# Patient Record
Sex: FEMALE | Race: ASIAN | URBAN - METROPOLITAN AREA
[De-identification: names, ages, dates, MRNs, and addresses within clinical notes are randomized per-mention and may not be internally consistent; named-entity substitution may affect disease eponyms.]

---

## 2017-12-06 ENCOUNTER — HISTORICAL (OUTPATIENT)
Dept: ADMINISTRATIVE | Facility: HOSPITAL | Age: 32
End: 2017-12-06

## 2017-12-06 LAB
ALBUMIN SERPL-MCNC: 2.7 GM/DL (ref 3.4–5)
ALBUMIN/GLOB SERPL: 1 RATIO (ref 1–2)
ALP SERPL-CCNC: 68 UNIT/L (ref 45–117)
ALT SERPL-CCNC: 16 UNIT/L (ref 12–78)
AST SERPL-CCNC: 12 UNIT/L (ref 15–37)
BILIRUB SERPL-MCNC: 0.3 MG/DL (ref 0.2–1)
BILIRUB SERPL-MCNC: NEGATIVE MG/DL
BILIRUBIN DIRECT+TOT PNL SERPL-MCNC: 0.1 MG/DL
BILIRUBIN DIRECT+TOT PNL SERPL-MCNC: 0.2 MG/DL
BLOOD URINE, POC: NEGATIVE
BUN SERPL-MCNC: 12 MG/DL (ref 7–18)
CALCIUM SERPL-MCNC: 8.7 MG/DL (ref 8.5–10.1)
CHLORIDE SERPL-SCNC: 108 MMOL/L (ref 98–107)
CLARITY, POC UA: CLEAR
CO2 SERPL-SCNC: 22 MMOL/L (ref 21–32)
COLOR, POC UA: YELLOW
CREAT SERPL-MCNC: 0.5 MG/DL (ref 0.6–1.3)
GLOBULIN SER-MCNC: 3.6 GM/ML (ref 2.3–3.5)
GLUCOSE SERPL-MCNC: 73 MG/DL (ref 74–106)
GLUCOSE UR QL STRIP: NEGATIVE
HBV CORE AB SERPL QL IA: REACTIVE
HBV SURFACE AG SERPL QL IA: POSITIVE
KETONES UR QL STRIP: NEGATIVE
LEUKOCYTE EST, POC UA: NORMAL
NITRITE, POC UA: NEGATIVE
PH, POC UA: 6
POTASSIUM SERPL-SCNC: 4.4 MMOL/L (ref 3.5–5.1)
PROT SERPL-MCNC: 6.3 GM/DL (ref 6.4–8.2)
PROTEIN, POC: NORMAL
SODIUM SERPL-SCNC: 141 MMOL/L (ref 136–145)
SPECIFIC GRAVITY, POC UA: 1.02
UROBILINOGEN, POC UA: NORMAL

## 2017-12-11 LAB
BILIRUB SERPL-MCNC: NEGATIVE MG/DL
BLOOD URINE, POC: NEGATIVE
CLARITY, POC UA: CLEAR
COLOR, POC UA: YELLOW
GLUCOSE UR QL STRIP: NEGATIVE
KETONES UR QL STRIP: NEGATIVE
LEUKOCYTE EST, POC UA: NEGATIVE
NITRITE, POC UA: NEGATIVE
PH, POC UA: 5
PROTEIN, POC: NORMAL
SPECIFIC GRAVITY, POC UA: 1.02
UROBILINOGEN, POC UA: NORMAL

## 2017-12-12 ENCOUNTER — HISTORICAL (OUTPATIENT)
Dept: ADMINISTRATIVE | Facility: HOSPITAL | Age: 32
End: 2017-12-12

## 2017-12-12 LAB
ABS NEUT (OLG): 4.76 X10(3)/MCL (ref 2.1–9.2)
BASOPHILS # BLD AUTO: 0.03 X10(3)/MCL
BASOPHILS NFR BLD AUTO: 0 % (ref 0–1)
EOSINOPHIL # BLD AUTO: 0.07 10*3/UL
EOSINOPHIL NFR BLD AUTO: 1 % (ref 0–5)
ERYTHROCYTE [DISTWIDTH] IN BLOOD BY AUTOMATED COUNT: 12.4 % (ref 11.5–14.5)
EST. AVERAGE GLUCOSE BLD GHB EST-MCNC: 108 MG/DL
GLUCOSE 2H P GLC SERPL-MCNC: 146 MG/DL (ref 65–139)
GLUCOSE P FAST SERPL-MCNC: 76 MG/DL (ref 65–99)
HBA1C MFR BLD: 5.4 % (ref 4.2–6.3)
HCT VFR BLD AUTO: 33 % (ref 35–46)
HGB BLD-MCNC: 10.9 GM/DL (ref 12–16)
IMM GRANULOCYTES # BLD AUTO: 0.02 10*3/UL
IMM GRANULOCYTES NFR BLD AUTO: 0 %
LYMPHOCYTES # BLD AUTO: 1.9 X10(3)/MCL
LYMPHOCYTES NFR BLD AUTO: 26 % (ref 15–40)
MCH RBC QN AUTO: 30.4 PG (ref 26–34)
MCHC RBC AUTO-ENTMCNC: 33 GM/DL (ref 31–37)
MCV RBC AUTO: 91.9 FL (ref 80–100)
MONOCYTES # BLD AUTO: 0.6 X10(3)/MCL
MONOCYTES NFR BLD AUTO: 8 % (ref 4–12)
NEUTROPHILS # BLD AUTO: 4.76 X10(3)/MCL
NEUTROPHILS NFR BLD AUTO: 65 X10(3)/MCL
PLATELET # BLD AUTO: 181 X10(3)/MCL (ref 130–400)
PMV BLD AUTO: 11.1 FL (ref 7.4–10.4)
RBC # BLD AUTO: 3.59 X10(6)/MCL (ref 4–5.2)
WBC # SPEC AUTO: 7.4 X10(3)/MCL (ref 4.5–11)

## 2017-12-26 ENCOUNTER — HISTORICAL (OUTPATIENT)
Dept: ADMINISTRATIVE | Facility: HOSPITAL | Age: 32
End: 2017-12-26

## 2017-12-26 LAB
ABS NEUT (OLG): 4.59 X10(3)/MCL (ref 2.1–9.2)
BASOPHILS # BLD AUTO: 0.03 X10(3)/MCL
BASOPHILS NFR BLD AUTO: 0 % (ref 0–1)
BILIRUB SERPL-MCNC: NEGATIVE MG/DL
BLOOD URINE, POC: NEGATIVE
CLARITY, POC UA: NORMAL
COLOR, POC UA: YELLOW
EOSINOPHIL # BLD AUTO: 0.03 10*3/UL
EOSINOPHIL NFR BLD AUTO: 0 % (ref 0–5)
ERYTHROCYTE [DISTWIDTH] IN BLOOD BY AUTOMATED COUNT: 13 % (ref 11.5–14.5)
GLUCOSE UR QL STRIP: NEGATIVE
HCT VFR BLD AUTO: 38.9 % (ref 35–46)
HGB BLD-MCNC: 12.5 GM/DL (ref 12–16)
HIV 1+2 AB+HIV1 P24 AG SERPL QL IA: NONREACTIVE
IMM GRANULOCYTES # BLD AUTO: 0.02 10*3/UL
IMM GRANULOCYTES NFR BLD AUTO: 0 %
KETONES UR QL STRIP: NEGATIVE
LEUKOCYTE EST, POC UA: NORMAL
LYMPHOCYTES # BLD AUTO: 2.02 X10(3)/MCL
LYMPHOCYTES NFR BLD AUTO: 28 % (ref 15–40)
MCH RBC QN AUTO: 29.3 PG (ref 26–34)
MCHC RBC AUTO-ENTMCNC: 32.1 GM/DL (ref 31–37)
MCV RBC AUTO: 91.3 FL (ref 80–100)
MONOCYTES # BLD AUTO: 0.46 X10(3)/MCL
MONOCYTES NFR BLD AUTO: 6 % (ref 4–12)
NEUTROPHILS # BLD AUTO: 4.59 X10(3)/MCL
NEUTROPHILS NFR BLD AUTO: 64 X10(3)/MCL
NITRITE, POC UA: NEGATIVE
PH, POC UA: 6
PLATELET # BLD AUTO: 192 X10(3)/MCL (ref 130–400)
PMV BLD AUTO: 11.4 FL (ref 7.4–10.4)
PROTEIN, POC: NORMAL
RBC # BLD AUTO: 4.26 X10(6)/MCL (ref 4–5.2)
SPECIFIC GRAVITY, POC UA: 1.02
UROBILINOGEN, POC UA: NORMAL
WBC # SPEC AUTO: 7.2 X10(3)/MCL (ref 4.5–11)

## 2017-12-28 LAB — FINAL CULTURE: NORMAL

## 2018-01-03 ENCOUNTER — HOSPITAL ENCOUNTER (OUTPATIENT)
Dept: MEDSURG UNIT | Facility: HOSPITAL | Age: 33
End: 2018-01-05
Attending: FAMILY MEDICINE | Admitting: FAMILY MEDICINE

## 2018-01-03 LAB
ABS NEUT (OLG): 6.61 X10(3)/MCL (ref 2.1–9.2)
ALBUMIN SERPL-MCNC: 2.5 GM/DL (ref 3.4–5)
ALBUMIN/GLOB SERPL: 1 RATIO (ref 1–2)
ALP SERPL-CCNC: 70 UNIT/L (ref 45–117)
ALT SERPL-CCNC: 19 UNIT/L (ref 12–78)
AMYLASE SERPL-CCNC: 41 UNIT/L (ref 25–115)
APPEARANCE, UA: CLEAR
APPEARANCE, UA: CLEAR
AST SERPL-CCNC: 13 UNIT/L (ref 15–37)
BACTERIA #/AREA URNS AUTO: ABNORMAL /[HPF]
BACTERIA #/AREA URNS AUTO: ABNORMAL /[HPF]
BASOPHILS # BLD AUTO: 0.02 X10(3)/MCL
BASOPHILS NFR BLD AUTO: 0 % (ref 0–1)
BILIRUB SERPL-MCNC: 0.3 MG/DL (ref 0.2–1)
BILIRUB UR QL STRIP: NEGATIVE
BILIRUB UR QL STRIP: NEGATIVE
BILIRUBIN DIRECT+TOT PNL SERPL-MCNC: 0.1 MG/DL
BILIRUBIN DIRECT+TOT PNL SERPL-MCNC: 0.2 MG/DL
BUN SERPL-MCNC: 15 MG/DL (ref 7–18)
CALCIUM SERPL-MCNC: 8.4 MG/DL (ref 8.5–10.1)
CHLORIDE SERPL-SCNC: 108 MMOL/L (ref 98–107)
CO2 SERPL-SCNC: 24 MMOL/L (ref 21–32)
COLOR UR: YELLOW
COLOR UR: YELLOW
CREAT SERPL-MCNC: 0.7 MG/DL (ref 0.6–1.3)
EOSINOPHIL # BLD AUTO: 0.01 X10(3)/MCL
EOSINOPHIL NFR BLD AUTO: 0 % (ref 0–5)
ERYTHROCYTE [DISTWIDTH] IN BLOOD BY AUTOMATED COUNT: 13.7 % (ref 11.5–14.5)
FLUAV AG NPH QL IA: NEGATIVE
FLUBV AG NPH QL IA: NEGATIVE
GLOBULIN SER-MCNC: 4.4 GM/ML (ref 2.3–3.5)
GLUCOSE (UA): NORMAL
GLUCOSE (UA): NORMAL
GLUCOSE SERPL-MCNC: 106 MG/DL (ref 74–106)
HCT VFR BLD AUTO: 29.7 % (ref 35–46)
HGB BLD-MCNC: 9.4 GM/DL (ref 12–16)
HGB UR QL STRIP: >1 MG/DL
HGB UR QL STRIP: NEGATIVE
HYALINE CASTS #/AREA URNS LPF: ABNORMAL /[LPF]
HYALINE CASTS #/AREA URNS LPF: ABNORMAL /[LPF]
IMM GRANULOCYTES # BLD AUTO: 0.04 10*3/UL
IMM GRANULOCYTES NFR BLD AUTO: 0 %
KETONES UR QL STRIP: 10 MG/DL
KETONES UR QL STRIP: ABNORMAL
LACTATE SERPL-SCNC: 1.8 MMOL/L (ref 0.4–2)
LEUKOCYTE ESTERASE UR QL STRIP: 250 LEU/UL
LEUKOCYTE ESTERASE UR QL STRIP: NEGATIVE
LIPASE SERPL-CCNC: 113 UNIT/L (ref 73–393)
LYMPHOCYTES # BLD AUTO: 1.35 X10(3)/MCL
LYMPHOCYTES NFR BLD AUTO: 15 % (ref 15–40)
MAGNESIUM SERPL-MCNC: 2.1 MG/DL (ref 1.8–2.4)
MCH RBC QN AUTO: 29.1 PG (ref 26–34)
MCHC RBC AUTO-ENTMCNC: 31.6 GM/DL (ref 31–37)
MCV RBC AUTO: 92 FL (ref 80–100)
MONOCYTES # BLD AUTO: 0.89 X10(3)/MCL
MONOCYTES NFR BLD AUTO: 10 % (ref 4–12)
NEUTROPHILS # BLD AUTO: 6.61 X10(3)/MCL
NEUTROPHILS NFR BLD AUTO: 74 X10(3)/MCL
NITRITE UR QL STRIP: NEGATIVE
NITRITE UR QL STRIP: NEGATIVE
PH UR STRIP: 6 [PH] (ref 4.5–8)
PH UR STRIP: 6.5 [PH] (ref 4.5–8)
PLATELET # BLD AUTO: 181 X10(3)/MCL (ref 130–400)
PMV BLD AUTO: 10 FL (ref 7.4–10.4)
POTASSIUM SERPL-SCNC: 3.4 MMOL/L (ref 3.5–5.1)
PROT SERPL-MCNC: 6.9 GM/DL (ref 6.4–8.2)
PROT UR QL STRIP: 20 MG/DL
PROT UR QL STRIP: 70 MG/DL
RBC # BLD AUTO: 3.23 X10(6)/MCL (ref 4–5.2)
RBC #/AREA URNS AUTO: ABNORMAL /[HPF]
RBC #/AREA URNS AUTO: ABNORMAL /[HPF]
SODIUM SERPL-SCNC: 141 MMOL/L (ref 136–145)
SP GR UR STRIP: 1.01 (ref 1–1.03)
SP GR UR STRIP: >1.035 (ref 1–1.03)
SQUAMOUS #/AREA URNS LPF: ABNORMAL /[LPF]
SQUAMOUS #/AREA URNS LPF: ABNORMAL /[LPF]
UROBILINOGEN UR STRIP-ACNC: 2 MG/DL
UROBILINOGEN UR STRIP-ACNC: >12 MG/DL
WBC # SPEC AUTO: 8.9 X10(3)/MCL (ref 4.5–11)
WBC #/AREA URNS AUTO: ABNORMAL /HPF
WBC #/AREA URNS AUTO: ABNORMAL /HPF

## 2018-01-04 LAB
ABS NEUT (OLG): 4.76 X10(3)/MCL (ref 2.1–9.2)
BASOPHILS # BLD AUTO: 0.02 X10(3)/MCL
BASOPHILS NFR BLD AUTO: 0 % (ref 0–1)
BUN SERPL-MCNC: 14 MG/DL (ref 7–18)
CALCIUM SERPL-MCNC: 8.3 MG/DL (ref 8.5–10.1)
CHLORIDE SERPL-SCNC: 111 MMOL/L (ref 98–107)
CO2 SERPL-SCNC: 23 MMOL/L (ref 21–32)
CREAT SERPL-MCNC: 0.6 MG/DL (ref 0.6–1.3)
EOSINOPHIL # BLD AUTO: 0.03 X10(3)/MCL
EOSINOPHIL NFR BLD AUTO: 0 % (ref 0–5)
ERYTHROCYTE [DISTWIDTH] IN BLOOD BY AUTOMATED COUNT: 13.4 % (ref 11.5–14.5)
GLUCOSE SERPL-MCNC: 86 MG/DL (ref 74–106)
HCT VFR BLD AUTO: 27.2 % (ref 35–46)
HGB BLD-MCNC: 8.6 GM/DL (ref 12–16)
IMM GRANULOCYTES # BLD AUTO: 0.03 10*3/UL
IMM GRANULOCYTES NFR BLD AUTO: 0 %
LYMPHOCYTES # BLD AUTO: 1.88 X10(3)/MCL
LYMPHOCYTES NFR BLD AUTO: 25 % (ref 15–40)
MCH RBC QN AUTO: 29.5 PG (ref 26–34)
MCHC RBC AUTO-ENTMCNC: 31.6 GM/DL (ref 31–37)
MCV RBC AUTO: 93.2 FL (ref 80–100)
MONOCYTES # BLD AUTO: 0.9 X10(3)/MCL
MONOCYTES NFR BLD AUTO: 12 % (ref 4–12)
NEUTROPHILS # BLD AUTO: 4.76 X10(3)/MCL
NEUTROPHILS NFR BLD AUTO: 62 X10(3)/MCL
PLATELET # BLD AUTO: 167 X10(3)/MCL (ref 130–400)
PMV BLD AUTO: 10.3 FL (ref 7.4–10.4)
POTASSIUM SERPL-SCNC: 3.9 MMOL/L (ref 3.5–5.1)
RBC # BLD AUTO: 2.92 X10(6)/MCL (ref 4–5.2)
SODIUM SERPL-SCNC: 143 MMOL/L (ref 136–145)
WBC # SPEC AUTO: 7.6 X10(3)/MCL (ref 4.5–11)

## 2018-01-05 LAB
ABS NEUT (OLG): 5.09 X10(3)/MCL (ref 2.1–9.2)
ALBUMIN SERPL-MCNC: 2.1 GM/DL (ref 3.4–5)
ALBUMIN/GLOB SERPL: 1 RATIO (ref 1–2)
ALP SERPL-CCNC: 64 UNIT/L (ref 45–117)
ALT SERPL-CCNC: 22 UNIT/L (ref 12–78)
AST SERPL-CCNC: 17 UNIT/L (ref 15–37)
BASOPHILS # BLD AUTO: 0.02 X10(3)/MCL
BASOPHILS NFR BLD AUTO: 0 % (ref 0–1)
BILIRUB SERPL-MCNC: 0.2 MG/DL (ref 0.2–1)
BILIRUBIN DIRECT+TOT PNL SERPL-MCNC: 0.1 MG/DL
BILIRUBIN DIRECT+TOT PNL SERPL-MCNC: 0.1 MG/DL
BUN SERPL-MCNC: 16 MG/DL (ref 7–18)
CALCIUM SERPL-MCNC: 8.3 MG/DL (ref 8.5–10.1)
CHLORIDE SERPL-SCNC: 111 MMOL/L (ref 98–107)
CO2 SERPL-SCNC: 23 MMOL/L (ref 21–32)
CREAT SERPL-MCNC: 0.5 MG/DL (ref 0.6–1.3)
EOSINOPHIL # BLD AUTO: 0.08 10*3/UL
EOSINOPHIL NFR BLD AUTO: 1 % (ref 0–5)
ERYTHROCYTE [DISTWIDTH] IN BLOOD BY AUTOMATED COUNT: 13.3 % (ref 11.5–14.5)
FINAL CULTURE: NO GROWTH
FINAL CULTURE: NORMAL
GLOBULIN SER-MCNC: 4 GM/ML (ref 2.3–3.5)
GLUCOSE SERPL-MCNC: 90 MG/DL (ref 74–106)
HCT VFR BLD AUTO: 26.8 % (ref 35–46)
HGB BLD-MCNC: 8.5 GM/DL (ref 12–16)
IMM GRANULOCYTES # BLD AUTO: 0.04 10*3/UL
IMM GRANULOCYTES NFR BLD AUTO: 1 %
LYMPHOCYTES # BLD AUTO: 1.39 X10(3)/MCL
LYMPHOCYTES NFR BLD AUTO: 19 % (ref 15–40)
MCH RBC QN AUTO: 29 PG (ref 26–34)
MCHC RBC AUTO-ENTMCNC: 31.7 GM/DL (ref 31–37)
MCV RBC AUTO: 91.5 FL (ref 80–100)
MONOCYTES # BLD AUTO: 0.6 X10(3)/MCL
MONOCYTES NFR BLD AUTO: 8 % (ref 4–12)
NEUTROPHILS # BLD AUTO: 5.09 X10(3)/MCL
NEUTROPHILS NFR BLD AUTO: 70 X10(3)/MCL
PLATELET # BLD AUTO: 193 X10(3)/MCL (ref 130–400)
PMV BLD AUTO: 9.8 FL (ref 7.4–10.4)
POTASSIUM SERPL-SCNC: 4.2 MMOL/L (ref 3.5–5.1)
PROT SERPL-MCNC: 6.1 GM/DL (ref 6.4–8.2)
RBC # BLD AUTO: 2.93 X10(6)/MCL (ref 4–5.2)
SODIUM SERPL-SCNC: 142 MMOL/L (ref 136–145)
WBC # SPEC AUTO: 7.2 X10(3)/MCL (ref 4.5–11)

## 2018-01-08 LAB — FINAL CULTURE: NORMAL

## 2019-05-01 ENCOUNTER — HISTORICAL (OUTPATIENT)
Dept: LAB | Facility: HOSPITAL | Age: 34
End: 2019-05-01

## 2019-05-01 LAB
ALBUMIN SERPL-MCNC: 3.9 GM/DL (ref 3.4–5)
ALBUMIN/GLOB SERPL: 1.1 RATIO (ref 1.1–2)
ALP SERPL-CCNC: 58 UNIT/L (ref 46–116)
ALT SERPL-CCNC: 20 UNIT/L (ref 12–78)
AST SERPL-CCNC: 13 UNIT/L (ref 15–37)
BILIRUB SERPL-MCNC: 0.4 MG/DL (ref 0.2–1)
BILIRUBIN DIRECT+TOT PNL SERPL-MCNC: 0.13 MG/DL (ref 0–0.2)
BILIRUBIN DIRECT+TOT PNL SERPL-MCNC: 0.27 MG/DL (ref 0–0.8)
BUN SERPL-MCNC: 20.1 MG/DL (ref 7–18)
CALCIUM SERPL-MCNC: 8.7 MG/DL (ref 8.5–10.1)
CHLORIDE SERPL-SCNC: 105 MMOL/L (ref 98–107)
CO2 SERPL-SCNC: 24.7 MMOL/L (ref 21–32)
CREAT SERPL-MCNC: 0.59 MG/DL (ref 0.6–1.3)
GLOBULIN SER-MCNC: 3.4 GM/DL (ref 2.4–3.5)
GLUCOSE SERPL-MCNC: 80 MG/DL (ref 74–106)
POTASSIUM SERPL-SCNC: 4.1 MMOL/L (ref 3.5–5.1)
PROT SERPL-MCNC: 7.3 GM/DL (ref 6.4–8.2)
SODIUM SERPL-SCNC: 139 MMOL/L (ref 136–145)

## 2020-07-21 ENCOUNTER — HISTORICAL (OUTPATIENT)
Dept: LAB | Facility: HOSPITAL | Age: 35
End: 2020-07-21

## 2020-07-21 LAB
ALBUMIN SERPL-MCNC: 4.2 GM/DL (ref 3.4–5)
ALBUMIN/GLOB SERPL: 1.2 RATIO (ref 1.1–2)
ALP SERPL-CCNC: 39 UNIT/L (ref 46–116)
ALT SERPL-CCNC: 24 UNIT/L (ref 12–78)
AST SERPL-CCNC: 18 UNIT/L (ref 15–37)
BILIRUB SERPL-MCNC: 0.7 MG/DL (ref 0.2–1)
BILIRUBIN DIRECT+TOT PNL SERPL-MCNC: 0.19 MG/DL (ref 0–0.2)
BILIRUBIN DIRECT+TOT PNL SERPL-MCNC: 0.51 MG/DL (ref 0–0.8)
BUN SERPL-MCNC: 12.9 MG/DL (ref 7–18)
CALCIUM SERPL-MCNC: 9.4 MG/DL (ref 8.5–10.1)
CHLORIDE SERPL-SCNC: 106 MMOL/L (ref 98–107)
CO2 SERPL-SCNC: 26.9 MMOL/L (ref 21–32)
CREAT SERPL-MCNC: 0.57 MG/DL (ref 0.6–1.3)
GLOBULIN SER-MCNC: 3.6 GM/DL (ref 2.4–3.5)
GLUCOSE SERPL-MCNC: 90 MG/DL (ref 74–106)
H PYLORI AB SER IA-ACNC: POSITIVE
LIPASE SERPL-CCNC: 101 UNIT/L (ref 73–393)
POTASSIUM SERPL-SCNC: 4.2 MMOL/L (ref 3.5–5.1)
PROT SERPL-MCNC: 7.8 GM/DL (ref 6.4–8.2)
SODIUM SERPL-SCNC: 143 MMOL/L (ref 136–145)

## 2022-04-30 NOTE — ED PROVIDER NOTES
Patient:   Kevin Lan             MRN: [id number]            FIN: [id number]               Age:   32 years     Sex:  Female     :  1985   Associated Diagnoses:   Bilateral leg pain; Acute cystitis   Author:   Artemio Daniels MD      Basic Information   Time seen: Date & time 1/3/2018 11:32:00.   History source: Patient.   Arrival mode: Private vehicle.   History limitation: Language barrier.   Additional information: Chief Complaint from Nursing Triage Note : Chief Complaint   1/3/2018 11:25 CST       Chief Complaint           pt brought in via wheelchair from [facility]; pt reports left leg pain; reports vaginal childbirth on 2017; pt reports having epidural at childbirth; ; pt reports lower mid abdominal pain/pain with urination; last bowel movement x3 days ago  .      History of Present Illness   The patient presents with 32-year-old female status post vaginal delivery on 2017 and discharged on 17 presents with bilateral leg pain left greater than worse for the past 2 days.  Patient denies trauma.  Patient did have an epidural.  Patient also reports that they stretch her legs too much during childbirth.  She admits to intermittent fevers and suprapubic pain.  Patient is on medication currently but we are unsure of what kind.  She states she is not breast-feeding.  Baby doing well.  No other complaints..  The onset was 2  days ago.  The course/duration of symptoms is constant.  Type of injury: none.  Location: Bilateral leg. The character of symptoms is pain, no swelling and not tingling.  The degree at present is minimal.  The exacerbating factor is movement.  The relieving factor is rest.  Risk factors consist of recent hospitalization.  Prior episodes: none.  Therapy today: see nurses notes.  Associated symptoms: none.        Review of Systems   Constitutional symptoms:  Fever, weakness.    Skin symptoms:  Negative except as documented in HPI.   Eye symptoms:  Negative  except as documented in HPI.   ENMT symptoms:  Negative except as documented in HPI.   Respiratory symptoms:  Negative except as documented in HPI.   Cardiovascular symptoms:  Negative except as documented in HPI.   Gastrointestinal symptoms:  Suprapubic.   Genitourinary symptoms:  Negative except as documented in HPI.   Musculoskeletal symptoms:  Muscle pain.   Neurologic symptoms:  Negative except as documented in HPI.   Psychiatric symptoms:  Negative except as documented in HPI.   Endocrine symptoms:  Negative except as documented in HPI.   Hematologic/Lymphatic symptoms:  Negative except as documented in HPI.   Allergy/immunologic symptoms:  Negative except as documented in HPI.      Health Status   Allergies:    Allergic Reactions (Selected)  No Known Allergies,    Allergies (1) Active Reaction  No Known Allergies None Documented  .      Past Medical/ Family/ Social History   Medical history:    Resolved  Pregnant ([id number]): Onset on 4/1/2017 at 32 years.  Resolved on 12/29/2017 at 32 years.  Pregnant ([id number]): Onset on 11/20/2012 at 27 years.  Resolved in 2013 at 27 years.  Pregnant ([id number]): Onset on 5/5/2005 at 20 years.  Resolved on 1/29/2006 at 20 years., Reviewed as documented in chart.   Surgical history:    No active procedure history items have been selected or recorded., Reviewed as documented in chart.   Family history:    No family history items have been selected or recorded., Reviewed as documented in chart.   Social history:    Social & Psychosocial Habits    Alcohol  12/06/2017  Use: Never    Employment/School  12/06/2017  Status: Unemployed    Substance Abuse  12/06/2017  Use: Never    Tobacco  12/06/2017  Use: Never smoker  , Reviewed as documented in chart, Family/social situation: Intact family.   Problem list:    Active Problems (1)  Encounter for ultrasound to check fetal growth   .      Physical Examination               Vital Signs   Vital Signs   1/3/2018 11:25 CST        Temperature Oral          37.5 DegC                             Temperature Oral (calculated)             99.50 DegF                             Peripheral Pulse Rate     120 bpm  HI                             Respiratory Rate          20 br/min                             SpO2                      99 %                             Oxygen Therapy            Room air                             Systolic Blood Pressure   120 mmHg                             Diastolic Blood Pressure  82 mmHg  .      No qualifying data available.   General:  Alert, no acute distress,  used.    Skin:  Warm, dry.    Head:  Normocephalic.   Neck:  Supple.   Eye:  Extraocular movements are intact, normal conjunctiva.    Ears, nose, mouth and throat:  Oral mucosa moist.   Cardiovascular:  No edema, Tachycardic.    Respiratory:  Lungs are clear to auscultation, respirations are non-labored.    Chest wall:  No tenderness.   Back:  Nontender, Normal range of motion, Normal alignment, No bruising or erythema to the lower back.  Nontender to palpation..    Musculoskeletal:  Left lower extremity-strength 3/5.  Full range of motion.  Nontender to palpation.  +2 dorsalis pedis and posterior tibial pulses.  Right lower extremity-strength 5 out of 5.  Full range of motion.  Nontender to palpation.  +2 dorsalis pedis and posterior tibial pulses.   Gastrointestinal:  Soft, Non distended, Normal bowel sounds, Mildly tender in the suprapubic region.    Neurological:  No focal neurological deficit observed.   Lymphatics:  No lymphadenopathy.   Psychiatric:  Cooperative.      Medical Decision Making   Documents reviewed:  Emergency department nurses' notes, emergency department records, prior records.    Results review:  Lab results : Lab View   1/3/2018 20:10 CST       UA Appear                 Clear                             UA Color                  YELLOW                             UA Spec Grav              >1.035  HI                              UA Bili                   Negative                             UA pH                     6.5                             UA Urobilinogen           >12 mg/dL                             UA Blood                  Negative                             UA Glucose                Normal                             UA Ketones                Trace                             UA Protein                70 mg/dL                             UA Nitrite                Negative                             UA Leuk Est               Negative                             UA WBC Interp             0-2 /HPF                             UA RBC Interp             3-5                             UA Bact Interp            None Seen                             UA Squam Epi Interp                                    UA Hyal Cast Interp       3-5  (Modified)                            Influ A Ag                Negative                             Influ B Ag                Negative                             Influ A/B Ag              N/A    1/3/2018 12:55 CST       UA Appear                 Clear                             UA Color                  YELLOW                             UA Spec Grav              1.014                             UA Bili                   Negative                             UA pH                     6.0                             UA Urobilinogen           2 mg/dL                             UA Blood                  >1.0 mg/dL                             UA Glucose                Normal                             UA Ketones                10 mg/dL                             UA Protein                20 mg/dL                             UA Nitrite                Negative                             UA Leuk Est               250 Tracy/uL                             UA WBC Interp             26-50 /HPF                             UA RBC Interp             11-25                             UA Bact Interp             Rare                             UA Squam Epi Interp                                    UA Hyal Cast Interp       3-5    1/3/2018 11:33 CST       Sodium Lvl                141 mmol/L                             Potassium Lvl             3.4 mmol/L  LOW                             Chloride                  108 mmol/L  HI                             CO2                       24 mmol/L                             Calcium Lvl               8.4 mg/dL  LOW                             Magnesium Lvl             2.1 mg/dL                             Glucose Lvl               106 mg/dL                             BUN                       15 mg/dL                             Creatinine                0.70 mg/dL                             eGFR-AA                   >105 mL/min                             eGFR-CARRIE                  103 mL/min                             Amylase Lvl               41 unit/L                             Bili Total                0.3 mg/dL                             Bili Direct               0.1 mg/dL                             Bili Indirect             0.2 mg/dL                             AST                       13 unit/L  LOW                             ALT                       19 unit/L                             Alk Phos                  70 unit/L                             Total Protein             6.9 gm/dL                             Albumin Lvl               2.5 gm/dL  LOW                             Globulin                  4.40 gm/mL  HI                             A/G Ratio                 1 ratio                             Lactic Acid Lvl           1.8 mmol/L                             Lipase Lvl                113 unit/L                             WBC                       8.9 x10(3)/mcL                             RBC                       3.23 x10(6)/mcL  LOW                             Hgb                       9.4 gm/dL  LOW                             Hct                        29.7 %  LOW                             Platelet                  181 x10(3)/mcL                             MCV                       92.0 fL                             MCH                       29.1 pg                             MCHC                      31.6 gm/dL                             RDW                       13.7 %                             MPV                       10.0 fL                             Abs Neut                  6.61 x10(3)/mcL                             Neutro Auto               74 x10(3)/mcL  NA                             Lymph Auto                15 %                             Mono Auto                 10 %                             Eos Auto                  0 %                             Abs Eos                   0.01 x10(3)/mcL  NA                             Basophil Auto             0 %                             Abs Neutro                6.61 x10(3)/mcL  NA                             Abs Lymph                 1.35 x10(3)/mcL  NA                             Abs Mono                  0.89 x10(3)/mcL  NA                             Abs Baso                  0.02 x10(3)/mcL  NA                             IG%                       0 %  NA                             IG#                       0.0400  NA  ,    No qualifying data available.    Radiology results:  Rad Results (ST)  < 12 hrs   Accession: [id number]  Order: XR Chest 1 View  Report Dt/Tm: 01/03/2018 16:13  Report:   Clinical History  Tachycardia     Technique  Portable Single view AP radiograph of the chest.     Comparison  No priors.     Findings  No focal opacification, pleural effusion, or pneumothorax. The  cardiomediastinal silhouette is within normal limits. No acute osseous  abnormality.     IMPRESSION:  No acute cardiopulmonary process.       Accession: [id number]  Order: CT Abdomen and Pelvis W Contrast  Report Dt/Tm: 01/03/2018 15:41  Report:   Indication: Abdominal pain      FINDINGS: Continuous axial images were performed through the abdomen  and pelvis following administration of IV contrast. No oral contrast  was given. Reformatted coronal and sagittal images were also obtained.  No prior studies are available for comparison.     There are small dependent bilateral pleural effusions. The lung bases  are otherwise clear except for minimal atelectasis in the left lower  lobe. Heart size is normal.     No parenchymal abnormalities are identified in the liver or spleen  although the spleen is enlarged measuring 14.1 cm in its craniocaudal  dimension. The pancreas, gallbladder and adrenals appear normal. The  kidneys opacify symmetrically with intravenous contrast demonstrating  no evidence of urinary obstruction or focal parenchymal abnormality.  No free intraperitoneal fluid or lymph node enlargement is identified.  A normal appendix is visible in the right lower quadrant.     Images obtained through the pelvis demonstrate no abnormality in the  urinary bladder. An enlarged uterus consistent with recent pregnancy  is noted with a small fluid collection also visible in the endometrial  cavity. The ovaries appear normal. No free pelvic fluid is  appreciated.     IMPRESSION:  1. Small dependent bilateral pleural effusions.  2. Splenomegaly.  3. Enlarged uterus consistent with recent pregnancy.      Accession: [id number]  Order: US Extremity Venous Bilateral  Report Dt/Tm: 01/03/2018 14:46  Report:   Indication: Postpartum bilateral leg pain     FINDINGS: Sonographic evaluation including color flow and pulsed  Doppler was performed through the veins of the lower extremities  bilaterally. The veins are fully compressible and demonstrate normal  venous flow characteristics at all levels surveyed. The waveforms are  however, pulsatile with evidence of reversal of flow identified at  multiple levels bilaterally consistent with valvular incompetence.     IMPRESSION:  1. No evidence of deep  venous thrombosis is identified.  2. Waveforms indicating reversal of flow associated with apparent  valvular incompetence.      Accession: [id number]  Order: XR Pelvis 1 or 2 Views  Report Dt/Tm: 01/03/2018 12:47  Report:   Indication: Pelvic pain     FINDINGS: Frontal views of the pelvis and both hips were performed  with the legs in neutral position and mild abduction. No fracture,  dislocation or focal bone lesions are identified. The hip joints  appear normal and symmetrical.     IMPRESSION: No evidence of acute injury.      .      Reexamination/ Reevaluation   Vital signs   12:53- patient able to ambulate from x-ray chair to bed.   Course: progressing as expected.   Pain status: decreased.   Assessment: exam improved.      Impression and Plan   Diagnosis   Bilateral leg pain (TUG70-NS M79.604)   Acute cystitis (BHG14-YO N30.00)      Calls-Consults   -  1/3/2018 16:50:00 , FM on call.    Plan   Condition: Stable.    Disposition: Admit time  1/3/2018 21:52:00, Place in Observation Unit, Reason for delay: Wait pending consult, observation, Dispositioned by: Time: 1/3/2018 11:51:00, Artemio Daniels MD.    Counseled: Patient, Regarding diagnosis, Regarding diagnostic results, Regarding treatment plan.

## 2022-04-30 NOTE — DISCHARGE SUMMARY
* Final Report *  [facility] FM Discharge Summary     Patient:   Kevin Lan             MRN: [id number]            FIN: [id number]               Age:   32 years     Sex:  Female     :  1985   Associated Diagnoses:   Female pelvic pain; Postpartum fever   Author:   Radha PATEL, Frank SZYMANSKI      Discharge Information      Discharge Summary Information   Admitted  1/3/2018   Discharged  2018   Discharge diagnosis     Female pelvic pain (QGT32-GL R10.2).     Postpartum fever (QAR74-DX O86.4).     Discharge medications     OTHER MEDICATIONS (Selected)   Prescriptions  Prescribed  Augmentin 875 mg-125 mg oral tablet: = 1 tab(s), Oral, q12hr, X 7 day(s), # 14 tab(s), 0 Refill(s)  Flagyl 500 mg oral tablet: 500 mg = 1 tab(s), Oral, TID, X 7 day(s), # 21 tab(s), 0 Refill(s)  ferrous gluconate 324 mg (38 mg elemental iron) oral tablet: = 1 tab(s), Oral, Daily, # 100 tab(s), 0 Refill(s)  ibuprofen 600 mg oral tablet: 600 mg = 1 tab(s), Oral, q6hr, PRN PRN as needed for pain, # 40 tab(s), 0 Refill(s).            Hospital Course    33 yo  with PMHx chronic Hep B carrier PPD#5 s/p  on 17 presented with L medial upper thigh pain since  that worsened 2 days prior to admit. The pain was described as a tendon pain. Worse with movement of either leg. Causing patient limited mobility, patient stated she was unable to walk due to the pain. Non radiating. Denied relief with OTC Motrin. Associated symptoms: fevers/chills, dysuria day prior with suprapubic pain that was reported to have resolved day ofadmit. Reported mild lochia, denies foul odor. Did confirm breast engorgement and tenderness; not breastfeeding, pumping, or wrapping breasts. Had 2nd degree vaginal lac during delivery, denied any problems regarding the same. Denied URI symptoms, nausea, vomiting, diarrhea, back pain, uterine pain. Last BM 3 days prior.    Patient sent to ED from [facility] triage as noted to be febrile at 38.1, tachycardic at  125, hypotensive at 90s/60s.     In the ED, was given 1 L NS bolus and 30mg Toradol with improvement of HR to 90s, BP to ~120/70s, and temp to 37.1. Several studies done including CXR negative for infiltrate, CT abd/pelvis that showed splenomegaly and enlarged uterus c/w recent pregnancy, US b/l LE with no DVT noted, and pelvic XR with no acute fracture noted.  Patient admitted to telemetry for treatment SIRS 2/4,  possible endometritis vs mastitis and suspected MSK pain 2/2 labor. Bcx and ucx with in and out cath were drawn and patient started on Zosyn.    GYN was consulted HD1 and suggested continuing IV abx until afebrile 24 hours for cannot rule out endometritis and lovenox for DVT ppx. PT was consulted to aid in patient ambulation. She was noted to have some mild swelling in her right arm following IV insertion and Zosyn dose without pain. U/S RUE negative. IV switched to left arm with resolution of edema in right arm but swelling in left arm, attributed to IV line/abx.     On HD2 patient was ambulating, tolerating full diet, with significant improvement in her leg pain following PT. Breast tenderness improved with breast binder. She remained afebrile for > 36 hours following initiation of abx. Her tachycardia was resolved with IV fluids. Bcx and ucx were NGTD >24 hours. She reported feeling well and was requesting discharge to home. She was deemed stable for discharge with f/u within 1 week.      Consults: GYN, PT  Procedures: none      Physical Examination   Vital Signs   1/5/2018 14:09 CST       Temperature Oral          36.9 DegC                             Temperature Oral (calculated)             98.42 DegF                             Peripheral Pulse Rate     80 bpm                             Heart Rate Monitored      86 bpm                             Respiratory Rate          17 br/min                             SpO2                      97 %                             Oxygen Therapy            Room  air                             Systolic Blood Pressure   126 mmHg                             Diastolic Blood Pressure  78 mmHg                             Mean Arterial Pressure, Cuff              94 mmHg                             Blood Pressure Location   Left arm     General:  Alert and oriented, No acute distress, Smiling as she walks.    Eye:  Pupils are equal, round and reactive to light, Normal conjunctiva.    HENT:  Normocephalic, Oral mucosa is moist.    Neck:  Supple, Non-tender.    Respiratory:  Lungs are clear to auscultation, Respirations are non-labored, Breath sounds are equal, Symmetrical chest wall expansion.    Cardiovascular:  Normal rate, Regular rhythm, No murmur, No edema.    Gastrointestinal:  Soft, Non-tender, Non-distended.    Genitourinary:  No fundal tenderness.    Musculoskeletal:  Normal range of motion, No swelling, No deformity, Mild pain over left pubic symphisis, inner supper thigh, Gait without pain, Interval resolution of RUE swelling.    Integumentary:  Warm, Dry.    Neurologic:  Alert, Oriented, No focal deficits.    Psychiatric:  Cooperative, Appropriate mood & affect.             Results Review   General results   Today's results   1/5/2018 6:25 CST        WBC                       7.2 x10(3)/mcL                             RBC                       2.93 x10(6)/mcL  LOW                             Hgb                       8.5 gm/dL  LOW                             Hct                       26.8 %  LOW                             Platelet                  193 x10(3)/mcL                             MCV                       91.5 fL                             MCH                       29.0 pg                             MCHC                      31.7 gm/dL                             RDW                       13.3 %                             MPV                       9.8 fL                             Abs Neut                  5.09 x10(3)/mcL                              Neutro Auto               70 x10(3)/mcL  NA                             Lymph Auto                19 %                             Mono Auto                 8 %                             Eos Auto                  1 %                             Abs Eos                   0.08  NA                             Basophil Auto             0 %                             Abs Neutro                5.09 x10(3)/mcL  NA                             Abs Lymph                 1.39 x10(3)/mcL  NA                             Abs Mono                  0.60 x10(3)/mcL  NA                             Abs Baso                  0.02 x10(3)/mcL  NA                             IG%                       1 %  NA                             IG#                       0.0400  NA                             Sodium Lvl                142 mmol/L                             Potassium Lvl             4.2 mmol/L                             Chloride                  111 mmol/L  HI                             CO2                       23 mmol/L                             Calcium Lvl               8.3 mg/dL  LOW                             Glucose Lvl               90 mg/dL                             BUN                       16 mg/dL                             Creatinine                0.50 mg/dL  LOW                             eGFR-AA                   >105 mL/min                             eGFR-CARRIE                  >105 mL/min                             Bili Total                0.2 mg/dL                             Bili Direct               0.1 mg/dL                             Bili Indirect             0.1 mg/dL                             AST                       17 unit/L                             ALT                       22 unit/L                             Alk Phos                  64 unit/L                             Total Protein             6.1 gm/dL  LOW                             Albumin Lvl               2.1 gm/dL  LOW                              Globulin                  4.00 gm/mL  HI                             A/G Ratio                 1 ratio        Discharge Plan        Condition upon discharge: Stable   Discharge Location: Home   Activity: As tolerated    Diet: Regular   Date of next appointment: 1/12/18 with Dr. Page    Issues to be addressed at follow-up: compliance with abx, iron, use of breast binder, LLE pain-possible PT referral if persistent; possible referral to ID for hepB carrier status/splenomegaly      Discharge Summary Plan   Discharge Status: improved.     Discharge instructions given: to patient.     Discharge disposition: discharge to home.       Result type: Discharge Summary  Result date: January 05, 2018 13:25 CST  Result status: Auth (Verified)  Result title: [facility] FM Discharge Summary  Performed by: Frank Garcia MD on January 09, 2018 13:26 CST  Verified by: Frank Garcia MD on January 09, 2018 13:54 CST  Encounter info: [id number], [facility], Clinic Visit, 1/30/2018 -     Doc has been moved by HIM specialist to the correct encounter.

## 2022-04-30 NOTE — CONSULTS
"   Patient:   Kevin Lan             MRN: [id number]            FIN: [id number]               Age:   32 years     Sex:  Female     :  1985   Associated Diagnoses:   None   Author:   Mary Villatoro MD      [facility] GYN Inpatient Consultation Note   Staff: Staff Tanisha   Resident: JERMAINE Villatoro II     Reason for consultation: r/o endometritis, mastitis vs bilateral breast engorgment    Subjective: 31 yo  admitted for SIRS 2/4 criteria with concerns of left, anterior, medial thigh pain, suprapubic tenderness, and breast tenderness. Patient is recently s/p  on Dec 29, 2017. Delivery described as overall uncomplicated. Prior to admission for labor, patient was noted to have rupture of fetal membranes for approximately 9 hours. GBS negative. Postpartum course significant for postpartum fevers of 100.5F and one midline superfifcial perineal laceration. Patient states she was placed in stirrups and pushed for a "long time" with an epidural in place. No evidence of treatment for concerns of endometritis during her postpartum course per chart review. Patient was dishcarged to home following spontaneous resolution of fevers x 24 hours. She states upon discharge she began spiking fevers again at home. Self medicated with tylenol/ibuprofen prn. It was not until the patient presented for her 1 week postpartum visit on 2018 that her symptoms worsened. Per MD staff, patient was diaphoretic, febrile to 100.5F, and tachycardic to 120s. She was given a 1L bolus with good response and resolution of fevers, but was sent to ED for futher workup. Due to concerns for sepsis, patient admitted to telemetry for IV antibiotics. Last febrile episode recorded at [facility] on 1/3/2018 of 102.2F. Additional concerns upon admit included dysuria and suprapubic tenderness, breast tenderness, and left thigh pain. Workup per Family Medicine service overall negative with only evidence of splenomegaly on CT abdomen and " "pelvis likely due to chronic Hep B carrier status of which patient does not take medication for. With regards to her dysuria, patient reports difficulty urinating for 1-2 days since discharge to home, but denies frequency, burning with urination, or inability to urinate. She states that area hurts when she applies pressure or moves quickly. She is currently not breastfeeding. Does not desire to breastfeed in the future. She denies heavy vaginal bleeding, describing bleeding as less than a menstrual period. No foul smelling lochia or fundal tenderness. Infant reported as doing well. Recent concerns on exam included right arm swelling following placement of peripheral IV overnight subsequently requiring patient's clothing to be "cut off of her". Non-tender upper extremity with no numbness or tingling. Denies loss of function or sensation. No CP/SOB, N/V, or chills.     Vitals: currently: BP: 123/81 P: 83 RR: 18 O2 sats 97% on RA     Physcial Exam:  NAD, well appearring  RRR  CTA-B, no wheezes, rhonchi, or crackles  Soft, non tender to palpation, non-distended with active bowel sounds  Fundus firm and 2 finger breat hwidths below the umbilicus; non tender   Minimal vaginal bleeding on peripad; no foul smelling discharge or abnormal discharge , normal female external genitalia, sutures along  midline perineum intact   calves non-tender to palpation, no edema; negative Holland's sign; suprapubic tenderness with direct palpation of pubic bone  referred pain to anterior medial thigh from left flexion of knee/hip with AD and ABduction; no lymphadenopathy present   No erythema, tenderness, masses identified along left anterior/medial thigh; no sensory loss or decrease in motor function     LAbs: H/H today of 8.6/27.2 from 9.4/29.7 on admit  UA with 250 leuks negative nitrates; likely contaminated due to + squamous cells  Clean catch UA negative with evidence of squamous cells   Blood and Cultures pending (Urine cultures with " no growth x24 hours)     Imaging:   -CXR: overall negative  -Pelvic xray: with no evidence of fracture  -CT Abdomen and pelvis:    IMPRESSION:  1. Small dependent bilateral pleural effusions.  2. Splenomegaly.  3. Enlarged uterus consistent with recent pregnancy.  -Bilateral Lower Extremity Doppler: negative for DVT  -Upper Extremity Doppler: negative for DVT     Assessment: 33 yo  PPD # 6 admitted for SIRS criteria 2/4 and rule out endometritis as well as mastitis. Currently on IV antibiotics with improvement in symptoms.     1. R/o endometritis: at this time, no clinical evidence concerning for endometritis on exam including foul smelling lochia or uterine fundal tenderness. Last febrile episode at 2100 on 2018. Patient currently undergoing broad spectrum antibiotics now s/p 2 doses of IV Zosyn ordered Q8H. Due to concerns for postpartum endometritis which patient was not treated for immediately postpartum, the possibility of edometritis can not be ruled out. Recommend to continue broad spectrum IV antibitoics until 24 hours afebrile and transition to PO antibiotics upon discharge to home. Tylenol prn recommended despite cocnern for antipyretics masking fevers as  antipyretics not likely to mask high fevers as seen on admit. Monitor bleeding and for abnormal discharge advised. F/u blood cultures. Urine cultures NGTD x24 hours.     2. R/o mastitis vs bilateral breast engorgment: Recommend to continue breast binder and daily ice packs. Patient currently on Zosyn, but does not desire to breastfeed following recovery. No concerns for mastitis on exam as patient overall non-tender to palpation and symptoms bilateral rather than unilateral in nature. Patient overall well appearing. Slightly engorged but no cocnerns for mastitis as source of infection.     3. Suprapubic tenderness with anterior, medial thigh pain : dysuria and tenderness improved from prior since start of IV antibiotics; following  examination with staff, referred pain to left anteromedial thigh ellicited with both aD and ABduction of felxed right knee and hip; as well as severe tenderness with direct palpation to pubic symphisis. At this time our concern is for inflammation of the pubic symphosis likely due to laxity of the joint during pregnancy and subsequent tightening/straining during delivery. No epidural site tenderness or inflammation. Recommend pelvic binder/lower abdominal brace for support and encourage ambulation. Lower extremity dopplers negative with concerns for DVT less likely due to benign exam.     4. R/o septic pelvic thrombophlebitis: in light of this disease being a diagnosis of exclusion, recommendations for prophylactic lovenox at this time.     5. Upper extremity swelling: UE doppler with no cocnerns for acute disease.     Patient seen and examined with staff, Dr. Josselin Gamboa. Recommendations as listed above. Thank you for consulting us for patient care. We appreicate the consultation. Please call on call resident for any acute gynecologic related concerns. At this time patient clear per GYN standpoint.       Mary DEAN II  [facility]   Pager: [phone]

## 2022-04-30 NOTE — H&P
Patient:   Kevin Lan             MRN: [id number]            FIN: [id number]               Age:   32 years     Sex:  Female     :  1985   Associated Diagnoses:   None   Author:   Jah PATEL, Benjamin ALSTON      Basic Information   Source of history:  Self.    Referral source:  Emergency department.    History limitation:  Language barrier, used language line.       Chief Complaint   1/3/2018 11:25 CST       pt brought in via wheelchair from [facility]; pt reports left leg pain; reports vaginal childbirth on 2017; pt reports having epidural at childbirth; ; pt reports lower mid abdominal pain/pain with urination; last bowel movement x3 days ago        History of Present Illness   31 yo  with PMHx chronic Hep B carrier PPD#5 s/p  on 17 presents with L medial upper thigh pain since  that worsened 2 days ago. The pain is described as a tendon pain. Worse with movement of either leg. Causing patient limited mobility, patient states she is unable to walk due to the pain. Non radiating. Denies relief with OTC Motrin. Associated symptoms: fevers/chills, dysuria yesterday with suprapubic pain that is resolving today. Reports mild lochia, denies foul odor. Does confirm breast engorgement and tenderness; not breastfeeding, pumping, or wrapping breasts. Had 2nd degree vaginal lac during delivery, denies any problems regarding the same. Denies URI symptoms, nausea, vomiting, diarrhea, back pain, uterine pain. Last BM 3 days ago.   Upon arrival to ED patient found to be febrile at 38.1, tachycardic at 125, hypotensive at 90s/60s, given 1 L NS bolus and 30mg Toradol with improvement of HR to 90s, BP to ~120/70s, and temp to 37.1. Several studies done including CXR negative for infiltrate, CT abd/pelvis that showed splenomegaly and enlarged uterus c/w recent pregnancy, US b/l LE with no DVT noted, and pelvic XR with no acute fracture noted.       Review of Systems   Constitutional:   Fever, Chills, Decreased activity, No fatigue.    Eye:  No recent visual problem, No blurring, No double vision.    Ear/Nose/Mouth/Throat:  No nasal congestion, No sore throat, No tinnitus.    Respiratory:  No shortness of breath, No cough, No wheezing.    Cardiovascular:  No chest pain, No palpitations, No peripheral edema, No syncope.    Gastrointestinal:  Constipation, No nausea, No vomiting, No diarrhea.    Genitourinary:  Dysuria, No hematuria, No urethral discharge.    Musculoskeletal:  Negative except as documented in history of present illness.    Integumentary:  No rash, No skin lesion.    Neurologic:  No numbness, No tingling, No headache.       Histories   Past Medical History: Hep B chronic carrier   Family History: Mother with congenital heart defect, brother with mental developmental problems   Procedure history: denies surgeries      Health Status   Allergies:    Allergic Reactions (Selected)  No Known Allergies   Current medications:  (Selected)   Inpatient Medications  Ordered  acetaminophen: 650 mg, form: Tab, Oral, q6hr PRN for fever, first dose 01/03/18 20:14:00 CST, > 38.1 degrees Celsius, 30,022  ibuprofen: 400 mg, form: Tab, Oral, q6hr PRN for pain, first dose 01/03/18 20:14:00 CST, mild/moderate, 30,022  Documented Medications  Documented  naproxen 250 mg oral tablet: 250 mg = 1 tab(s), Oral, BID, # 60 tab(s), 0 Refill(s),    Medications (2) Active  Scheduled: (0)  Continuous: (0)  PRN: (2)  acetaminophen 325 mg Tab  650 mg 2 tab(s), Oral, q6hr  ibuprofen 400 mg Tab UD  400 mg 1 tab(s), Oral, q6hr        Physical Examination      Vital Signs (last 24 hrs)_____  Last Charted___________  Temp Oral     37.1 DegC  (JAN 03 16:37)  Heart Rate Peripheral   93 bpm  (JAN 03 19:43)  Resp Rate         16 br/min  (JAN 03 14:04)  SBP      139 mmHg  (JAN 03 19:13)  DBP      75 mmHg  (JAN 03 19:13)  SpO2      100 %  (JAN 03 19:43)     General:  Alert and oriented, No acute distress.    Eye:  Pupils are  equal, round and reactive to light, Extraocular movements are intact, Normal conjunctiva.    HENT:  Oral mucosa is moist, No pharyngeal erythema.    Neck:  Supple, No lymphadenopathy, No thyromegaly.    Respiratory:  Lungs are clear to auscultation, Respirations are non-labored, Breath sounds are equal, Symmetrical chest wall expansion.    Cardiovascular:  Regular rhythm, No murmur, Good pulses equal in all extremities, No edema, Tachycardic.    Gastrointestinal:  Soft, Non-tender, Normal bowel sounds, No organomegaly.    Genitourinary:  No costovertebral angle tenderness, No lesions, Uterus firm below umbilicus, Minimal lochia.    Musculoskeletal:  Left upper inner thigh tenderness, maximally at 5cm distal to labia; reproducible with movement; negative straight leg raise; R leg exam WNL.    Integumentary:  Warm, Dry, Intact, No rash.    Neurologic:  Alert, Oriented, Normal sensory, No focal deficits, Normal deep tendon reflexes.    Psychiatric:  Cooperative.    Breast:  engorged, tender bilaterally, with induration in gravity dependent areas.       Review / Management   Radiology results   Rad Results (ST)   Accession: [id number]  Order: XR Chest 1 View  Report Dt/Tm: 01/03/2018 16:13  Report:   Clinical History  Tachycardia     Technique  Portable Single view AP radiograph of the chest.     Comparison  No priors.     Findings  No focal opacification, pleural effusion, or pneumothorax. The  cardiomediastinal silhouette is within normal limits. No acute osseous  abnormality.     IMPRESSION:  No acute cardiopulmonary process.       Accession: [id number]  Order: CT Abdomen and Pelvis W Contrast  Report Dt/Tm: 01/03/2018 15:41  Report:   Indication: Abdominal pain     FINDINGS: Continuous axial images were performed through the abdomen  and pelvis following administration of IV contrast. No oral contrast  was given. Reformatted coronal and sagittal images were also obtained.  No prior studies are available for  comparison.     There are small dependent bilateral pleural effusions. The lung bases  are otherwise clear except for minimal atelectasis in the left lower  lobe. Heart size is normal.     No parenchymal abnormalities are identified in the liver or spleen  although the spleen is enlarged measuring 14.1 cm in its craniocaudal  dimension. The pancreas, gallbladder and adrenals appear normal. The  kidneys opacify symmetrically with intravenous contrast demonstrating  no evidence of urinary obstruction or focal parenchymal abnormality.  No free intraperitoneal fluid or lymph node enlargement is identified.  A normal appendix is visible in the right lower quadrant.     Images obtained through the pelvis demonstrate no abnormality in the  urinary bladder. An enlarged uterus consistent with recent pregnancy  is noted with a small fluid collection also visible in the endometrial  cavity. The ovaries appear normal. No free pelvic fluid is  appreciated.     IMPRESSION:  1. Small dependent bilateral pleural effusions.  2. Splenomegaly.  3. Enlarged uterus consistent with recent pregnancy.      Accession: [id number]  Order: US Extremity Venous Bilateral  Report Dt/Tm: 01/03/2018 14:46  Report:   Indication: Postpartum bilateral leg pain     FINDINGS: Sonographic evaluation including color flow and pulsed  Doppler was performed through the veins of the lower extremities  bilaterally. The veins are fully compressible and demonstrate normal  venous flow characteristics at all levels surveyed. The waveforms are  however, pulsatile with evidence of reversal of flow identified at  multiple levels bilaterally consistent with valvular incompetence.     IMPRESSION:  1. No evidence of deep venous thrombosis is identified.  2. Waveforms indicating reversal of flow associated with apparent  valvular incompetence.      Accession: [id number]  Order: XR Pelvis 1 or 2 Views  Report Dt/Tm: 01/03/2018 12:47  Report:   Indication: Pelvic  pain     FINDINGS: Frontal views of the pelvis and both hips were performed  with the legs in neutral position and mild abduction. No fracture,  dislocation or focal bone lesions are identified. The hip joints  appear normal and symmetrical.     IMPRESSION: No evidence of acute injury.            Impression and Plan   SIRS 2/4 criteria  -suspected endometritis vs. breast engorgement / possible developing mastitis  -will start on Zosyn 3.375gm q6h   -VS currently stable after one time dose of Toradol and 1 L bolus NS  -continue maintenance IVF  -Blood and urine cultures pending  -GYN consulted, discussed case with resident physician, who discussed with staff; appreciate recs    L leg pain  -possible muscle strain s/p vaginal delivery; continue to monitor for improvement  -no DVT on BLE US  -CT with no acute findings suggesting intraabdominal cause, consider reviewing imaging with radiology in AM  -pelvic XR with no fracture  -consult PT for mobility  -cool compresses    Breast engorgement  -wrap breasts  -cool compresses    Dispo: admit observation status to telemetry; follow blood and urine cultures; dispo pending continued clinical improvement

## 2022-09-17 ENCOUNTER — HISTORICAL (OUTPATIENT)
Dept: ADMINISTRATIVE | Facility: HOSPITAL | Age: 37
End: 2022-09-17